# Patient Record
Sex: MALE | Race: ASIAN | NOT HISPANIC OR LATINO | URBAN - METROPOLITAN AREA
[De-identification: names, ages, dates, MRNs, and addresses within clinical notes are randomized per-mention and may not be internally consistent; named-entity substitution may affect disease eponyms.]

---

## 2017-07-03 ENCOUNTER — EMERGENCY (EMERGENCY)
Facility: HOSPITAL | Age: 32
LOS: 1 days | Discharge: HOME | End: 2017-07-03
Admitting: EMERGENCY MEDICINE
Payer: COMMERCIAL

## 2017-07-03 VITALS
DIASTOLIC BLOOD PRESSURE: 76 MMHG | HEART RATE: 89 BPM | SYSTOLIC BLOOD PRESSURE: 136 MMHG | TEMPERATURE: 98 F | OXYGEN SATURATION: 100 % | RESPIRATION RATE: 16 BRPM

## 2017-07-03 VITALS
OXYGEN SATURATION: 97 % | TEMPERATURE: 100 F | RESPIRATION RATE: 18 BRPM | HEART RATE: 118 BPM | DIASTOLIC BLOOD PRESSURE: 83 MMHG | SYSTOLIC BLOOD PRESSURE: 138 MMHG

## 2017-07-03 PROCEDURE — 99284 EMERGENCY DEPT VISIT MOD MDM: CPT

## 2017-07-03 RX ORDER — ACETAMINOPHEN 500 MG
650 TABLET ORAL ONCE
Qty: 0 | Refills: 0 | Status: COMPLETED | OUTPATIENT
Start: 2017-07-03 | End: 2017-07-03

## 2017-07-03 RX ORDER — IBUPROFEN 200 MG
1 TABLET ORAL
Qty: 15 | Refills: 0 | OUTPATIENT
Start: 2017-07-03

## 2017-07-03 RX ORDER — KETOROLAC TROMETHAMINE 30 MG/ML
30 SYRINGE (ML) INJECTION ONCE
Qty: 0 | Refills: 0 | Status: DISCONTINUED | OUTPATIENT
Start: 2017-07-03 | End: 2017-07-03

## 2017-07-03 RX ADMIN — Medication 30 MILLIGRAM(S): at 10:45

## 2017-07-03 RX ADMIN — Medication 1 TABLET(S): at 10:44

## 2017-07-03 RX ADMIN — Medication 650 MILLIGRAM(S): at 11:32

## 2017-07-03 RX ADMIN — Medication 30 MILLIGRAM(S): at 10:44

## 2017-07-03 NOTE — ED PROVIDER NOTE - CARE PLAN
Principal Discharge DX:	Tonsillitis  Instructions for follow-up, activity and diet:	Follow up with your PMD within 1-2 days. Rest, increase fluids. Take Motrin 600mg every 6 hrs as needed for pain or temp >100.3 alternate with Tylenol 650mg every 4-6 hrs as needed. Take the antibiotic as prescribed- 1 tab 2x/day for 10 days. Worsening, continued or new concerning symptoms return to the emergency department. Follow up on your strep culture you had done from urgent care yesterday.

## 2017-07-03 NOTE — ED PROVIDER NOTE - THROAT FINDINGS
Throat erythematous. Bilateral symmetric tonsillar swelling. Diffuse exudates. Patent airways. No uvular swelling./TONSILLAR SWELLING/OROPHARYNGEAL EXUDATE/THROAT RED

## 2017-07-03 NOTE — ED PROVIDER NOTE - PLAN OF CARE
Follow up with your PMD within 1-2 days. Rest, increase fluids. Take Motrin 600mg every 6 hrs as needed for pain or temp >100.3 alternate with Tylenol 650mg every 4-6 hrs as needed. Take the antibiotic as prescribed- 1 tab 2x/day for 10 days. Worsening, continued or new concerning symptoms return to the emergency department. Follow up on your strep culture you had done from urgent care yesterday.

## 2017-07-03 NOTE — ED ADULT TRIAGE NOTE - CHIEF COMPLAINT QUOTE
Patient c/o generalized body aches, sore throat and subjective fevers for two days. Symptoms unrelieved with Motrin. Seen at urgent  care center and had negative rapid strep and flu. Denies abdominal pain, N/V/D. Denies PMH/PSH. Patient c/o generalized body aches, sore throat with some difficulty swallowing and subjective fevers for two days. Symptoms unrelieved with Motrin. Seen at urgent  care center and had negative rapid strep and flu. Denies abdominal pain, N/V/D. Denies PMH/PSH.

## 2017-07-03 NOTE — ED PROVIDER NOTE - PROGRESS NOTE DETAILS
PA Anastasiao- pt feeling much better s/p PO fluids, Tylenol and Toradol. Started on Augmentin. Will DC home with PMD follow up,

## 2018-04-27 NOTE — ED PROVIDER NOTE - MEDICAL DECISION MAKING DETAILS
no 32 y/o M with tonsillitis- PO Fluids, Toradol, Augmentin, pt to follow up on strep culture from Urgent care yesterday and follow up with PMD for recheck
